# Patient Record
Sex: MALE | Race: BLACK OR AFRICAN AMERICAN | NOT HISPANIC OR LATINO | URBAN - METROPOLITAN AREA
[De-identification: names, ages, dates, MRNs, and addresses within clinical notes are randomized per-mention and may not be internally consistent; named-entity substitution may affect disease eponyms.]

---

## 2018-05-03 ENCOUNTER — INPATIENT (INPATIENT)
Facility: HOSPITAL | Age: 53
LOS: 3 days | Discharge: HOME | End: 2018-05-07
Attending: INTERNAL MEDICINE | Admitting: INTERNAL MEDICINE
Payer: COMMERCIAL

## 2018-05-03 VITALS
HEART RATE: 73 BPM | DIASTOLIC BLOOD PRESSURE: 78 MMHG | OXYGEN SATURATION: 98 % | TEMPERATURE: 99 F | HEIGHT: 64 IN | WEIGHT: 147.93 LBS | RESPIRATION RATE: 18 BRPM | SYSTOLIC BLOOD PRESSURE: 148 MMHG

## 2018-05-03 DIAGNOSIS — K56.609 UNSPECIFIED INTESTINAL OBSTRUCTION, UNSPECIFIED AS TO PARTIAL VERSUS COMPLETE OBSTRUCTION: ICD-10-CM

## 2018-05-03 DIAGNOSIS — K43.9 VENTRAL HERNIA WITHOUT OBSTRUCTION OR GANGRENE: ICD-10-CM

## 2018-05-03 LAB
ALBUMIN SERPL ELPH-MCNC: 4.3 G/DL — SIGNIFICANT CHANGE UP (ref 3.3–5)
ALP SERPL-CCNC: 89 U/L — SIGNIFICANT CHANGE UP (ref 40–120)
ALT FLD-CCNC: 35 U/L — SIGNIFICANT CHANGE UP (ref 12–78)
ANION GAP SERPL CALC-SCNC: 12 MMOL/L — SIGNIFICANT CHANGE UP (ref 5–17)
APPEARANCE UR: CLEAR — SIGNIFICANT CHANGE UP
APTT BLD: 25.8 SEC — LOW (ref 27.5–37.4)
AST SERPL-CCNC: 24 U/L — SIGNIFICANT CHANGE UP (ref 15–37)
BASOPHILS # BLD AUTO: 0.03 K/UL — SIGNIFICANT CHANGE UP (ref 0–0.2)
BASOPHILS NFR BLD AUTO: 0.3 % — SIGNIFICANT CHANGE UP (ref 0–2)
BILIRUB SERPL-MCNC: 0.4 MG/DL — SIGNIFICANT CHANGE UP (ref 0.2–1.2)
BILIRUB UR-MCNC: NEGATIVE — SIGNIFICANT CHANGE UP
BLD GP AB SCN SERPL QL: SIGNIFICANT CHANGE UP
BUN SERPL-MCNC: 18 MG/DL — SIGNIFICANT CHANGE UP (ref 7–23)
CALCIUM SERPL-MCNC: 10.3 MG/DL — HIGH (ref 8.5–10.1)
CHLORIDE SERPL-SCNC: 101 MMOL/L — SIGNIFICANT CHANGE UP (ref 96–108)
CO2 SERPL-SCNC: 29 MMOL/L — SIGNIFICANT CHANGE UP (ref 22–31)
COLOR SPEC: YELLOW — SIGNIFICANT CHANGE UP
CREAT SERPL-MCNC: 1.17 MG/DL — SIGNIFICANT CHANGE UP (ref 0.5–1.3)
DIFF PNL FLD: NEGATIVE — SIGNIFICANT CHANGE UP
EOSINOPHIL # BLD AUTO: 0.02 K/UL — SIGNIFICANT CHANGE UP (ref 0–0.5)
EOSINOPHIL NFR BLD AUTO: 0.2 % — SIGNIFICANT CHANGE UP (ref 0–6)
GLUCOSE SERPL-MCNC: 207 MG/DL — HIGH (ref 70–99)
GLUCOSE UR QL: 100 MG/DL
HCT VFR BLD CALC: 47 % — SIGNIFICANT CHANGE UP (ref 39–50)
HGB BLD-MCNC: 15.8 G/DL — SIGNIFICANT CHANGE UP (ref 13–17)
IMM GRANULOCYTES NFR BLD AUTO: 0.2 % — SIGNIFICANT CHANGE UP (ref 0–1.5)
INR BLD: 1.12 RATIO — SIGNIFICANT CHANGE UP (ref 0.88–1.16)
KETONES UR-MCNC: ABNORMAL
LACTATE SERPL-SCNC: 2.4 MMOL/L — HIGH (ref 0.7–2)
LACTATE SERPL-SCNC: 2.8 MMOL/L — HIGH (ref 0.7–2)
LEUKOCYTE ESTERASE UR-ACNC: NEGATIVE — SIGNIFICANT CHANGE UP
LIDOCAIN IGE QN: 151 U/L — SIGNIFICANT CHANGE UP (ref 73–393)
LYMPHOCYTES # BLD AUTO: 1.36 K/UL — SIGNIFICANT CHANGE UP (ref 1–3.3)
LYMPHOCYTES # BLD AUTO: 14.6 % — SIGNIFICANT CHANGE UP (ref 13–44)
MCHC RBC-ENTMCNC: 29.6 PG — SIGNIFICANT CHANGE UP (ref 27–34)
MCHC RBC-ENTMCNC: 33.6 GM/DL — SIGNIFICANT CHANGE UP (ref 32–36)
MCV RBC AUTO: 88.2 FL — SIGNIFICANT CHANGE UP (ref 80–100)
MONOCYTES # BLD AUTO: 0.27 K/UL — SIGNIFICANT CHANGE UP (ref 0–0.9)
MONOCYTES NFR BLD AUTO: 2.9 % — SIGNIFICANT CHANGE UP (ref 2–14)
NEUTROPHILS # BLD AUTO: 7.6 K/UL — HIGH (ref 1.8–7.4)
NEUTROPHILS NFR BLD AUTO: 81.8 % — HIGH (ref 43–77)
NITRITE UR-MCNC: NEGATIVE — SIGNIFICANT CHANGE UP
NRBC # BLD: 0 /100 WBCS — SIGNIFICANT CHANGE UP (ref 0–0)
PH UR: 8 — SIGNIFICANT CHANGE UP (ref 5–8)
PLATELET # BLD AUTO: 226 K/UL — SIGNIFICANT CHANGE UP (ref 150–400)
POTASSIUM SERPL-MCNC: 4.5 MMOL/L — SIGNIFICANT CHANGE UP (ref 3.5–5.3)
POTASSIUM SERPL-SCNC: 4.5 MMOL/L — SIGNIFICANT CHANGE UP (ref 3.5–5.3)
PROT SERPL-MCNC: 8.6 GM/DL — HIGH (ref 6–8.3)
PROT UR-MCNC: 15 MG/DL
PROTHROM AB SERPL-ACNC: 12.2 SEC — SIGNIFICANT CHANGE UP (ref 9.8–12.7)
RBC # BLD: 5.33 M/UL — SIGNIFICANT CHANGE UP (ref 4.2–5.8)
RBC # FLD: 12.9 % — SIGNIFICANT CHANGE UP (ref 10.3–14.5)
SODIUM SERPL-SCNC: 142 MMOL/L — SIGNIFICANT CHANGE UP (ref 135–145)
SP GR SPEC: 1.01 — SIGNIFICANT CHANGE UP (ref 1.01–1.02)
UROBILINOGEN FLD QL: NEGATIVE MG/DL — SIGNIFICANT CHANGE UP
WBC # BLD: 9.3 K/UL — SIGNIFICANT CHANGE UP (ref 3.8–10.5)
WBC # FLD AUTO: 9.3 K/UL — SIGNIFICANT CHANGE UP (ref 3.8–10.5)

## 2018-05-03 PROCEDURE — 74177 CT ABD & PELVIS W/CONTRAST: CPT | Mod: 26

## 2018-05-03 PROCEDURE — 71045 X-RAY EXAM CHEST 1 VIEW: CPT | Mod: 26

## 2018-05-03 PROCEDURE — 99285 EMERGENCY DEPT VISIT HI MDM: CPT | Mod: 25

## 2018-05-03 RX ORDER — ONDANSETRON 8 MG/1
4 TABLET, FILM COATED ORAL ONCE
Qty: 0 | Refills: 0 | Status: COMPLETED | OUTPATIENT
Start: 2018-05-03 | End: 2018-05-03

## 2018-05-03 RX ORDER — IOHEXOL 300 MG/ML
30 INJECTION, SOLUTION INTRAVENOUS ONCE
Qty: 0 | Refills: 0 | Status: COMPLETED | OUTPATIENT
Start: 2018-05-03 | End: 2018-05-03

## 2018-05-03 RX ORDER — MORPHINE SULFATE 50 MG/1
2 CAPSULE, EXTENDED RELEASE ORAL ONCE
Qty: 0 | Refills: 0 | Status: DISCONTINUED | OUTPATIENT
Start: 2018-05-03 | End: 2018-05-03

## 2018-05-03 RX ORDER — PANTOPRAZOLE SODIUM 20 MG/1
40 TABLET, DELAYED RELEASE ORAL ONCE
Qty: 0 | Refills: 0 | Status: COMPLETED | OUTPATIENT
Start: 2018-05-03 | End: 2018-05-03

## 2018-05-03 RX ORDER — SODIUM CHLORIDE 9 MG/ML
1000 INJECTION INTRAMUSCULAR; INTRAVENOUS; SUBCUTANEOUS ONCE
Qty: 0 | Refills: 0 | Status: COMPLETED | OUTPATIENT
Start: 2018-05-03 | End: 2018-05-03

## 2018-05-03 RX ORDER — MORPHINE SULFATE 50 MG/1
2 CAPSULE, EXTENDED RELEASE ORAL EVERY 6 HOURS
Qty: 0 | Refills: 0 | Status: DISCONTINUED | OUTPATIENT
Start: 2018-05-03 | End: 2018-05-06

## 2018-05-03 RX ORDER — HEPARIN SODIUM 5000 [USP'U]/ML
5000 INJECTION INTRAVENOUS; SUBCUTANEOUS EVERY 12 HOURS
Qty: 0 | Refills: 0 | Status: DISCONTINUED | OUTPATIENT
Start: 2018-05-03 | End: 2018-05-07

## 2018-05-03 RX ORDER — SODIUM CHLORIDE 9 MG/ML
2000 INJECTION INTRAMUSCULAR; INTRAVENOUS; SUBCUTANEOUS ONCE
Qty: 0 | Refills: 0 | Status: COMPLETED | OUTPATIENT
Start: 2018-05-03 | End: 2018-05-03

## 2018-05-03 RX ORDER — SODIUM CHLORIDE 9 MG/ML
3 INJECTION INTRAMUSCULAR; INTRAVENOUS; SUBCUTANEOUS ONCE
Qty: 0 | Refills: 0 | Status: COMPLETED | OUTPATIENT
Start: 2018-05-03 | End: 2018-05-03

## 2018-05-03 RX ORDER — SODIUM CHLORIDE 9 MG/ML
1000 INJECTION, SOLUTION INTRAVENOUS
Qty: 0 | Refills: 0 | Status: DISCONTINUED | OUTPATIENT
Start: 2018-05-03 | End: 2018-05-06

## 2018-05-03 RX ADMIN — HEPARIN SODIUM 5000 UNIT(S): 5000 INJECTION INTRAVENOUS; SUBCUTANEOUS at 17:59

## 2018-05-03 RX ADMIN — IOHEXOL 30 MILLILITER(S): 300 INJECTION, SOLUTION INTRAVENOUS at 06:45

## 2018-05-03 RX ADMIN — SODIUM CHLORIDE 2000 MILLILITER(S): 9 INJECTION INTRAMUSCULAR; INTRAVENOUS; SUBCUTANEOUS at 08:33

## 2018-05-03 RX ADMIN — SODIUM CHLORIDE 100 MILLILITER(S): 9 INJECTION, SOLUTION INTRAVENOUS at 13:05

## 2018-05-03 RX ADMIN — SODIUM CHLORIDE 1000 MILLILITER(S): 9 INJECTION INTRAMUSCULAR; INTRAVENOUS; SUBCUTANEOUS at 04:24

## 2018-05-03 RX ADMIN — ONDANSETRON 4 MILLIGRAM(S): 8 TABLET, FILM COATED ORAL at 04:24

## 2018-05-03 RX ADMIN — MORPHINE SULFATE 2 MILLIGRAM(S): 50 CAPSULE, EXTENDED RELEASE ORAL at 07:23

## 2018-05-03 RX ADMIN — MORPHINE SULFATE 2 MILLIGRAM(S): 50 CAPSULE, EXTENDED RELEASE ORAL at 06:45

## 2018-05-03 RX ADMIN — PANTOPRAZOLE SODIUM 40 MILLIGRAM(S): 20 TABLET, DELAYED RELEASE ORAL at 04:25

## 2018-05-03 RX ADMIN — SODIUM CHLORIDE 3 MILLILITER(S): 9 INJECTION INTRAMUSCULAR; INTRAVENOUS; SUBCUTANEOUS at 04:27

## 2018-05-03 NOTE — ED PROVIDER NOTE - MEDICAL DECISION MAKING DETAILS
Pending CT and repeat lactate. Patient care transitioned to incoming team.  All decisions regarding the progression of care will be made at their discretion.

## 2018-05-03 NOTE — ED ADULT NURSE REASSESSMENT NOTE - NS ED NURSE REASSESS COMMENT FT1
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

## 2018-05-03 NOTE — CONSULT NOTE ADULT - ASSESSMENT
Findings consistent with a partial small bowel obstruction with   transition in the mid to distal ileum in the left midabdomen anteriorly.

## 2018-05-03 NOTE — ED ADULT NURSE REASSESSMENT NOTE - NS ED NURSE REASSESS COMMENT FT1
patient A&Ox3 in no acute distress , denied chest [pain , denied headache denied difficulty breathing

## 2018-05-03 NOTE — ED PROVIDER NOTE - CARE PLAN
Principal Discharge DX:	Acute gastritis without hemorrhage, unspecified gastritis type Principal Discharge DX:	SBO (small bowel obstruction)

## 2018-05-03 NOTE — ED PROVIDER NOTE - OBJECTIVE STATEMENT
Pertinent PMH/PSH/FHx/SHx and Review of Systems contained within:  54 yo m with pmh of umbilical hernia presents in ED c/o abdominal pain and nbnb vomitus today s/p eating pasta and meat. No aggravating or relieving factors, No fever/chills, No photophobia/eye pain/changes in vision, No ear pain/sore throat/dysphagia, No chest pain/palpitations, no SOB/cough/wheeze/stridor, No D, no dysuria/frequency/discharge, No neck/back pain, no rash, no changes in neurological status/function.

## 2018-05-03 NOTE — ED ADULT NURSE REASSESSMENT NOTE - NS ED NURSE REASSESS COMMENT FT1
Pt alert and oriented, no apparent distress noted at this time. Pt handed off to RN in stable condition.

## 2018-05-03 NOTE — H&P ADULT - HISTORY OF PRESENT ILLNESS
54 yo m with pmh of umbilical hernia presents in ED c/o abdominal pain and nbnb vomitus today s/p eating pasta and meat. No aggravating or relieving factors, No fever/chills, No photophobia/eye pain/changes in vision, No ear pain/sore throat/dysphagia, No chest pain/palpitations, no SOB/cough/wheeze/stridor, No D, no dysuria/frequency/discharge, No neck/back pain, no rash, no changes in neurological status/function.

## 2018-05-03 NOTE — H&P ADULT - NSHPPHYSICALEXAM_GEN_ALL_CORE
PHYSICAL EXAM:    GENERAL: NAD, well-groomed, well-developed  HEAD:  Atraumatic, Normocephalic  EYES: EOMI, PERRLA, conjunctiva and sclera clear  ENMT: No tonsillar erythema, exudates, or enlargement; Moist mucous membranes, Good dentition, No lesions  NECK: Supple, No JVD, Normal thyroid  NERVOUS SYSTEM:  Alert & Oriented X3, Good concentration; Motor Strength 5/5 B/L upper and lower extremities; DTRs 2+ intact and symmetric  CHEST/LUNG: Clear to percussion bilaterally; No rales, rhonchi, wheezing, or rubs  HEART: Regular rate and rhythm; No murmurs, rubs, or gallops  ABDOMEN: tender, distended; Bowel sounds exaggerated  EXTREMITIES:  2+ Peripheral Pulses, No clubbing, cyanosis, or edema  LYMPH: No lymphadenopathy noted  SKIN: No rashes or lesions

## 2018-05-03 NOTE — H&P ADULT - NSHPLABSRESULTS_GEN_ALL_CORE
CBC Full  -  ( 03 May 2018 04:36 )  WBC Count : 9.30 K/uL  Hemoglobin : 15.8 g/dL  Hematocrit : 47.0 %  Platelet Count - Automated : 226 K/uL  Mean Cell Volume : 88.2 fl  Mean Cell Hemoglobin : 29.6 pg  Mean Cell Hemoglobin Concentration : 33.6 gm/dL  Auto Neutrophil # : 7.60 K/uL  Auto Lymphocyte # : 1.36 K/uL  Auto Monocyte # : 0.27 K/uL  Auto Eosinophil # : 0.02 K/uL  Auto Basophil # : 0.03 K/uL  Auto Neutrophil % : 81.8 %  Auto Lymphocyte % : 14.6 %  Auto Monocyte % : 2.9 %  Auto Eosinophil % : 0.2 %  Auto Basophil % : 0.3 %  03 May 2018 04:36    142    |  101    |  18     ----------------------------<  207    4.5     |  29     |  1.17     Ca    10.3       03 May 2018 04:36    TPro  8.6    /  Alb  4.3    /  TBili  0.4    /  DBili  x      /  AST  24     /  ALT  35     /  AlkPhos  89     03 May 2018 04:    < from: CT Abdomen and Pelvis w/ Oral Cont and w/ IV Cont (05.03.18 @ 08:25) >      Findings consistent with a partial small bowel obstruction with   transition in the mid to distal ileum in the left midabdomen anteriorly.   Evidence of prior surgery, and appearance suggests cicatricial change   and/or scarring in the region of transition. Also there is a ventral   hernia in the region of the umbilicus, with the prolapse of bowel and fat   but without incarceration. Scarring is also suggested in this region.   Further assessment and follow-up recommended.    < end of copied text >

## 2018-05-03 NOTE — CONSULT NOTE ADULT - PROBLEM SELECTOR RECOMMENDATION 9
Admitted to Dr Anyoku  Bowel rest  IV Fluid Resuscitation  Plan NGT if recurrent vomiting  Repeat Labs and AXR

## 2018-05-03 NOTE — ED ADULT NURSE NOTE - OBJECTIVE STATEMENT
52 y/o male PMHx umbilical hernia presents to the ED with lower abd pain time of onset 9pm after eating dinner at  home. c/o N/V, denies diarrhea, dysuria, hematuria, flank pain, fevers, chills 52 y/o male PMHx umbilical hernia, hernia repair presents to the ED with sharp lower abd pain time of onset 9pm after eating dinner at  home. c/o N/V, denies diarrhea, dysuria, hematuria, flank pain, fevers, chills.

## 2018-05-03 NOTE — CONSULT NOTE ADULT - SUBJECTIVE AND OBJECTIVE BOX
HPI:  54 yo m with pmh of umbilical hernia presents in ED c/o abdominal pain and nbnb vomitus today s/p eating pasta and meat.   No aggravating or relieving factors, No fever/chills, No photophobia/eye pain/changes in vision, No ear pain/sore throat/dysphagia,   No chest pain/palpitations, no SOB/cough/wheeze/stridor, No D, no dysuria/frequency/discharge, No neck/back pain, no rash, no changes in neurological status/function.      PAST MEDICAL & SURGICAL HISTORY:  Umbilical hernia  Hx of removal of cyst: scalp-2008      MEDICATIONS  (STANDING):    MEDICATIONS  (PRN):      Allergies    No Known Allergies    Intolerances        SOCIAL HISTORY:  No drug use    FAMILY HISTORY:      REVIEW OF SYSTEMS:  CONSTITUTIONAL: In no acute distress.  EYES: No eye pain, visual disturbances, or discharge  ENMT:  No difficulty hearing, tinnitus, vertigo; No sinus or throat pain  NECK: No pain or stiffness  BREASTS: No pain, masses, or nipple discharge  RESPIRATORY: No cough, wheezing, chills or hemoptysis; No shortness of breath  CARDIOVASCULAR: No chest pain, palpitations, dizziness, or leg swelling  GASTROINTESTINAL: abdominal pain.  nausea, vomiting,   GENITOURINARY: No dysuria, frequency, hematuria, or incontinence  NEUROLOGICAL: No headaches, memory loss, loss of strength, numbness, or tremors  SKIN: No itching, burning, rashes, or lesions   LYMPH NODES: No enlarged glands  ENDOCRINE: No heat or cold intolerance; No hair loss  MUSCULOSKELETAL: No joint pain or swelling; No muscle, back, or extremity pain  PSYCHIATRIC: No depression, anxiety, mood swings, or difficulty sleeping  HEME/LYMPH: No easy bruising, or bleeding gums  ALLERGY AND IMMUNOLOGIC: No hives or eczema      Vital Signs Last 24 Hrs  T(C): 36.7 (03 May 2018 07:23), Max: 37.1 (03 May 2018 03:08)  T(F): 98.1 (03 May 2018 07:23), Max: 98.7 (03 May 2018 03:08)  HR: 80 (03 May 2018 07:23) (73 - 80)  BP: 123/84 (03 May 2018 07:23) (123/84 - 155/83)  BP(mean): --  RR: 18 (03 May 2018 07:23) (18 - 18)  SpO2: 98% (03 May 2018 07:23) (98% - 100%)    Daily Height in cm: 162.56 (03 May 2018 03:08)    Daily     PHYSICAL EXAM:  GENERAL: NAD, well-groomed, well-developed  HEAD:  Atraumatic, Normocephalic  EYES: EOMI, PERRL, conjunctiva and sclera clear  ENMT: Moist mucous membranes, Good dentition, No lesions  NECK: Supple, No JVD  NERVOUS SYSTEM:  Alert & Oriented X3, Good concentration  CHEST/LUNG: Clear to percussion bilaterally; Normal respiratory effort  HEART: Regular rate and rhythm  ABDOMEN: Soft, mild tenderness, mild distended; Bowel sounds hypoactive  : normal external genitalia  BREASTS: no breast lumps  EXTREMITIES:  No clubbing, cyanosis, or edema  VASC: equal peripheral pulses  LYMPH: No lymphadenopathy noted  SKIN: No rashes or lesions  PSYCH: normal affect    LABS:                        15.8   9.30  )-----------( 226      ( 03 May 2018 04:36 )             47.0     Neutrophil %:       142  |  101  |  18  ----------------------------<  207<H>  4.5   |  29  |  1.17    Ca    10.3<H>      03 May 2018 04:36    TPro  8.6<H>  /  Alb  4.3  /  TBili  0.4  /  DBili  x   /  AST  24  /  ALT  35  /  AlkPhos  89        Urinalysis Basic - ( 03 May 2018 06:18 )    Color: Yellow / Appearance: Clear / S.015 / pH: x  Gluc: x / Ketone: Trace  / Bili: Negative / Urobili: Negative mg/dL   Blood: x / Protein: 15 mg/dL / Nitrite: Negative   Leuk Esterase: Negative / RBC: x / WBC x   Sq Epi: x / Non Sq Epi: x / Bacteria: x        RADIOLOGY & ADDITIONAL STUDIES:  < from: CT Abdomen and Pelvis w/ Oral Cont and w/ IV Cont (18 @ 08:25) >  GASTROINTESTINAL STRUCTURES:  There is dilatation of small bowel loops in   the jejunum and ileum. There is a gradual transition in the right   midabdomen, in a region suggesting the possibility of scar and/or   cicatricial change rather than a discrete mass. There is no CT evidence   of appendicitis. There is stool in the colon. There is suggestion of   prior abdominal surgery, and there is prolapse of bowel and fat in the   umbilicus region through a rectus diastases.  HEPATOBILIARY:  The gallbladder is normal in appearance.  No calculus is   visible.  The intrahepatic biliary ducts are not dilated.  The common   duct is normal in size.  LIVER:  Unremarkable in appearance.  SPLEEN:  No enlargement or focal lesion is evident.    PANCREAS:  Unremarkable in appearance.  ADRENAL GLANDS:  Within normal limits.  KIDNEYS:  No calculi or hydronephrosis is noted. There is a left renal   cyst measuring 4 cm.  AORTA:  Normal in size.   PELVIC ORGANS:  Unremarkable in appearance.  BLADDER:  No wall thickening or filling defect is noted.  No UVJ calculus   is suggested.  BONES:  No evidence of fracture or bony destructive lesion.  MISC:A disc herniation is noted at L4-5 with a bulge at L5-S1. Lower   thoracic degenerative changes are noted.    < from: CT Abdomen and Pelvis w/ Oral Cont and w/ IV Cont (18 @ 08:25) >  IMPRESSION:       Findings consistent with a partial small bowel obstruction with   transition in the mid to distal ileum in the left midabdomen anteriorly.   Evidence of prior surgery, and appearance suggests cicatricial change   and/or scarring in the region of transition. Also there is a ventral   hernia in the region of the umbilicus, with the prolapse of bowel and fat   but without incarceration. Scarring is also suggested in this region.   Further assessment and follow-up recommended.    < end of copied text >

## 2018-05-04 LAB
ALBUMIN SERPL ELPH-MCNC: 3.1 G/DL — LOW (ref 3.3–5)
ALP SERPL-CCNC: 68 U/L — SIGNIFICANT CHANGE UP (ref 40–120)
ALT FLD-CCNC: 24 U/L — SIGNIFICANT CHANGE UP (ref 12–78)
ANION GAP SERPL CALC-SCNC: 8 MMOL/L — SIGNIFICANT CHANGE UP (ref 5–17)
AST SERPL-CCNC: 16 U/L — SIGNIFICANT CHANGE UP (ref 15–37)
BILIRUB SERPL-MCNC: 0.4 MG/DL — SIGNIFICANT CHANGE UP (ref 0.2–1.2)
BUN SERPL-MCNC: 9 MG/DL — SIGNIFICANT CHANGE UP (ref 7–23)
CALCIUM SERPL-MCNC: 8.6 MG/DL — SIGNIFICANT CHANGE UP (ref 8.5–10.1)
CHLORIDE SERPL-SCNC: 110 MMOL/L — HIGH (ref 96–108)
CO2 SERPL-SCNC: 27 MMOL/L — SIGNIFICANT CHANGE UP (ref 22–31)
CREAT SERPL-MCNC: 0.97 MG/DL — SIGNIFICANT CHANGE UP (ref 0.5–1.3)
CULTURE RESULTS: NO GROWTH — SIGNIFICANT CHANGE UP
GLUCOSE SERPL-MCNC: 153 MG/DL — HIGH (ref 70–99)
HCT VFR BLD CALC: 38 % — LOW (ref 39–50)
HGB BLD-MCNC: 12.7 G/DL — LOW (ref 13–17)
LACTATE SERPL-SCNC: 1.2 MMOL/L — SIGNIFICANT CHANGE UP (ref 0.7–2)
MCHC RBC-ENTMCNC: 29.6 PG — SIGNIFICANT CHANGE UP (ref 27–34)
MCHC RBC-ENTMCNC: 33.4 GM/DL — SIGNIFICANT CHANGE UP (ref 32–36)
MCV RBC AUTO: 88.6 FL — SIGNIFICANT CHANGE UP (ref 80–100)
NRBC # BLD: 0 /100 WBCS — SIGNIFICANT CHANGE UP (ref 0–0)
PLATELET # BLD AUTO: 177 K/UL — SIGNIFICANT CHANGE UP (ref 150–400)
POTASSIUM SERPL-MCNC: 3.6 MMOL/L — SIGNIFICANT CHANGE UP (ref 3.5–5.3)
POTASSIUM SERPL-SCNC: 3.6 MMOL/L — SIGNIFICANT CHANGE UP (ref 3.5–5.3)
PROT SERPL-MCNC: 6.4 GM/DL — SIGNIFICANT CHANGE UP (ref 6–8.3)
RBC # BLD: 4.29 M/UL — SIGNIFICANT CHANGE UP (ref 4.2–5.8)
RBC # FLD: 13.1 % — SIGNIFICANT CHANGE UP (ref 10.3–14.5)
SODIUM SERPL-SCNC: 145 MMOL/L — SIGNIFICANT CHANGE UP (ref 135–145)
SPECIMEN SOURCE: SIGNIFICANT CHANGE UP
WBC # BLD: 4.58 K/UL — SIGNIFICANT CHANGE UP (ref 3.8–10.5)
WBC # FLD AUTO: 4.58 K/UL — SIGNIFICANT CHANGE UP (ref 3.8–10.5)

## 2018-05-04 PROCEDURE — 74018 RADEX ABDOMEN 1 VIEW: CPT | Mod: 26,59

## 2018-05-04 PROCEDURE — 74019 RADEX ABDOMEN 2 VIEWS: CPT | Mod: 26

## 2018-05-04 RX ADMIN — SODIUM CHLORIDE 100 MILLILITER(S): 9 INJECTION, SOLUTION INTRAVENOUS at 21:30

## 2018-05-04 RX ADMIN — HEPARIN SODIUM 5000 UNIT(S): 5000 INJECTION INTRAVENOUS; SUBCUTANEOUS at 17:20

## 2018-05-04 RX ADMIN — HEPARIN SODIUM 5000 UNIT(S): 5000 INJECTION INTRAVENOUS; SUBCUTANEOUS at 05:29

## 2018-05-04 RX ADMIN — SODIUM CHLORIDE 100 MILLILITER(S): 9 INJECTION, SOLUTION INTRAVENOUS at 11:34

## 2018-05-04 NOTE — PROGRESS NOTE ADULT - SUBJECTIVE AND OBJECTIVE BOX
CC:  Patient seen and examined bedside resting comfortably.  No new complaints offered.   Denies chest pain, dyspnea, cough.      HPI:  52 yo m with pmh of umbilical hernia presents in ED c/o abdominal pain and nbnb vomitus today s/p eating pasta and meat. No aggravating or relieving factors, No fever/chills, No photophobia/eye pain/changes in vision, No ear pain/sore throat/dysphagia, No chest pain/palpitations, no SOB/cough/wheeze/stridor, No D, no dysuria/frequency/discharge, No neck/back pain, no rash, no changes in neurological status/function. (03 May 2018 09:40)      T(F): 97.8 (05-04-18 @ 12:10), Max: 98.2 (05-03-18 @ 23:48)  HR: 62 (05-04-18 @ 12:10) (62 - 69)  BP: 151/93 (05-04-18 @ 12:10) (134/83 - 151/93)  RR: 18 (05-04-18 @ 12:10) (17 - 20)  SpO2: 99% (05-04-18 @ 12:10) (91% - 99%)  Wt(kg): --  CAPILLARY BLOOD GLUCOSE          PHYSICAL EXAM:  General: NAD, WDWN.   Neuro:  Alert & oriented x 3  HEENT: NCAT, EOMI, conjunctiva clear  CV: +S1+S2 regular rate and rhythm  Lung: clear to ausculation bilaterally, respirations nonlabored, good inspiratory effort  Abdomen: soft, NTND. Normactive BS  Extremities: no pedal edema or calf tenderness noted     LABS:                        12.7   4.58  )-----------( 177      ( 04 May 2018 06:17 )             38.0     05-04    145  |  110<H>  |  9   ----------------------------<  153<H>  3.6   |  27  |  0.97    Ca    8.6      04 May 2018 06:17    TPro  6.4  /  Alb  3.1<L>  /  TBili  0.4  /  DBili  x   /  AST  16  /  ALT  24  /  AlkPhos  68  05-04    PT/INR - ( 03 May 2018 12:28 )   PT: 12.2 sec;   INR: 1.12 ratio         PTT - ( 03 May 2018 12:28 )  PTT:25.8 sec      I&O's Detail    03 May 2018 07:01  -  04 May 2018 07:00  --------------------------------------------------------  IN:    dextrose 5% + sodium chloride 0.9%.: 1400 mL  Total IN: 1400 mL    OUT:    Nasoenteral Tube: 200 mL  Total OUT: 200 mL    Total NET: 1200 mL            Assessment: 53y Male admitted with SMALL BOWEL OBSTRUCTION  ABDOMINAL PAIN    PMH Umbilical hernia      Plan:  -  -continue VTE and GI prophylaxis: heparin and protonix IV  -f/u labs  -will discuss with

## 2018-05-04 NOTE — PROGRESS NOTE ADULT - SUBJECTIVE AND OBJECTIVE BOX
SURGERY PROGRESS HPI:  Pt seen and examined at bedside with family present. Denies pain and complaints. Pt tolerating NGT. Pt denies nausea and vomiting. +Passed flatus a few times today. No BM. Voiding well. Pt denies chest pain, SOB, dizziness, fever, chills. Ambulating.    Vital Signs Last 24 Hrs  T(C): 36.6 (04 May 2018 18:20), Max: 36.8 (03 May 2018 23:48)  T(F): 97.9 (04 May 2018 18:20), Max: 98.2 (03 May 2018 23:48)  HR: 76 (04 May 2018 18:20) (62 - 76)  BP: 155/82 (04 May 2018 18:20) (134/83 - 155/82)  BP(mean): --  RR: 16 (04 May 2018 18:20) (16 - 18)  SpO2: 98% (04 May 2018 18:20) (97% - 99%)      PHYSICAL EXAM:    GENERAL: NAD  HEAD: NGT with bilious output 300cc/12hrs  CHEST/LUNG: Clear to ausculation, bilaterally   HEART: RRR S1S2  ABDOMEN: non distended, +BS, soft, non tender, no guarding  EXTREMITIES:  calf soft, non tender b/l    I&O's Detail    03 May 2018 07:01  -  04 May 2018 07:00  --------------------------------------------------------  IN:    dextrose 5% + sodium chloride 0.9%.: 1400 mL  Total IN: 1400 mL    OUT:    Nasoenteral Tube: 200 mL  Total OUT: 200 mL    Total NET: 1200 mL      04 May 2018 07:01  -  04 May 2018 21:28  --------------------------------------------------------  IN:    Oral Fluid: 240 mL  Total IN: 240 mL    OUT:    Nasoenteral Tube: 300 mL    Voided: 300 mL  Total OUT: 600 mL    Total NET: -360 mL      LABS:                        12.7   4.58  )-----------( 177      ( 04 May 2018 06:17 )             38.0     05-04    145  |  110<H>  |  9   ----------------------------<  153<H>  3.6   |  27  |  0.97    Ca    8.6      04 May 2018 06:17    TPro  6.4  /  Alb  3.1<L>  /  TBili  0.4  /  DBili  x   /  AST  16  /  ALT  24  /  AlkPhos  68  05-04    PT/INR - ( 03 May 2018 12:28 )   PT: 12.2 sec;   INR: 1.12 ratio    PTT - ( 03 May 2018 12:28 )  PTT:25.8 sec    Xray Abdomen Minimum 3 Views (05.04.18 @ 09:34)   IMPRESSION: No visible bowel distention at this time. NG tube is coiled   in the stomach and incidental findings as above.      Assessment: 53M admitted with PSBO. +Flatus    Plan:  -Per discussion with Dr. Hodges, will withdraw NGT partially to the appropriate position  -possible clamp trial tomorrow   -continue DVT prophylaxis, OOB, Ambulating   -f/u labs  -discussed with Dr. Hodges SURGERY PROGRESS HPI:  Pt seen and examined at bedside with family present. Denies pain and complaints.   Pt tolerating NGT. Pt denies nausea and vomiting. +Passed flatus a few times today. No BM. Voiding well. Pt denies chest pain, SOB, dizziness, fever, chills. Ambulating.    Vital Signs Last 24 Hrs  T(C): 36.6 (04 May 2018 18:20), Max: 36.8 (03 May 2018 23:48)  T(F): 97.9 (04 May 2018 18:20), Max: 98.2 (03 May 2018 23:48)  HR: 76 (04 May 2018 18:20) (62 - 76)  BP: 155/82 (04 May 2018 18:20) (134/83 - 155/82)  BP(mean): --  RR: 16 (04 May 2018 18:20) (16 - 18)  SpO2: 98% (04 May 2018 18:20) (97% - 99%)      PHYSICAL EXAM:    GENERAL: NAD  HEAD: NGT with bilious output 300cc/12hrs  CHEST/LUNG: Clear to ausculation, bilaterally   HEART: RRR S1S2  ABDOMEN: non distended, +BS, soft, non tender, no guarding  EXTREMITIES:  calf soft, non tender b/l    I&O's Detail    03 May 2018 07:01  -  04 May 2018 07:00  --------------------------------------------------------  IN:    dextrose 5% + sodium chloride 0.9%.: 1400 mL  Total IN: 1400 mL    OUT:    Nasoenteral Tube: 200 mL  Total OUT: 200 mL    Total NET: 1200 mL      04 May 2018 07:01  -  04 May 2018 21:28  --------------------------------------------------------  IN:    Oral Fluid: 240 mL  Total IN: 240 mL    OUT:    Nasoenteral Tube: 300 mL    Voided: 300 mL  Total OUT: 600 mL    Total NET: -360 mL      LABS:                        12.7   4.58  )-----------( 177      ( 04 May 2018 06:17 )             38.0     05-04    145  |  110<H>  |  9   ----------------------------<  153<H>  3.6   |  27  |  0.97    Ca    8.6      04 May 2018 06:17    TPro  6.4  /  Alb  3.1<L>  /  TBili  0.4  /  DBili  x   /  AST  16  /  ALT  24  /  AlkPhos  68  05-04    PT/INR - ( 03 May 2018 12:28 )   PT: 12.2 sec;   INR: 1.12 ratio    PTT - ( 03 May 2018 12:28 )  PTT:25.8 sec    Xray Abdomen Minimum 3 Views (05.04.18 @ 09:34)   IMPRESSION: No visible bowel distention at this time. NG tube is coiled   in the stomach and incidental findings as above.      Assessment: 53M admitted with PSBO. +Flatus    Plan:  -Per discussion with Dr. Hodges, will withdraw NGT partially to the appropriate position  -possible clamp trial tomorrow   -continue DVT prophylaxis, OOB, Ambulating   -f/u labs  -discussed with Dr. Hodges

## 2018-05-04 NOTE — PROGRESS NOTE ADULT - PROBLEM SELECTOR PLAN 1
Admit  NPO  NGT  Intravenous fluids  Pain management  Surgical evaluation appreciated.  continue NGT WITH SUCTION.  Serial xrays.

## 2018-05-05 LAB
ANION GAP SERPL CALC-SCNC: 10 MMOL/L — SIGNIFICANT CHANGE UP (ref 5–17)
BUN SERPL-MCNC: 6 MG/DL — LOW (ref 7–23)
CALCIUM SERPL-MCNC: 9.2 MG/DL — SIGNIFICANT CHANGE UP (ref 8.5–10.1)
CHLORIDE SERPL-SCNC: 105 MMOL/L — SIGNIFICANT CHANGE UP (ref 96–108)
CO2 SERPL-SCNC: 29 MMOL/L — SIGNIFICANT CHANGE UP (ref 22–31)
CREAT SERPL-MCNC: 0.97 MG/DL — SIGNIFICANT CHANGE UP (ref 0.5–1.3)
GLUCOSE SERPL-MCNC: 134 MG/DL — HIGH (ref 70–99)
HCT VFR BLD CALC: 42.3 % — SIGNIFICANT CHANGE UP (ref 39–50)
HGB BLD-MCNC: 14.2 G/DL — SIGNIFICANT CHANGE UP (ref 13–17)
MAGNESIUM SERPL-MCNC: 2 MG/DL — SIGNIFICANT CHANGE UP (ref 1.6–2.6)
MCHC RBC-ENTMCNC: 30.3 PG — SIGNIFICANT CHANGE UP (ref 27–34)
MCHC RBC-ENTMCNC: 33.6 GM/DL — SIGNIFICANT CHANGE UP (ref 32–36)
MCV RBC AUTO: 90.2 FL — SIGNIFICANT CHANGE UP (ref 80–100)
NRBC # BLD: 0 /100 WBCS — SIGNIFICANT CHANGE UP (ref 0–0)
PHOSPHATE SERPL-MCNC: 3.2 MG/DL — SIGNIFICANT CHANGE UP (ref 2.5–4.5)
PLATELET # BLD AUTO: 196 K/UL — SIGNIFICANT CHANGE UP (ref 150–400)
POTASSIUM SERPL-MCNC: 3.6 MMOL/L — SIGNIFICANT CHANGE UP (ref 3.5–5.3)
POTASSIUM SERPL-SCNC: 3.6 MMOL/L — SIGNIFICANT CHANGE UP (ref 3.5–5.3)
RBC # BLD: 4.69 M/UL — SIGNIFICANT CHANGE UP (ref 4.2–5.8)
RBC # FLD: 13.1 % — SIGNIFICANT CHANGE UP (ref 10.3–14.5)
SODIUM SERPL-SCNC: 144 MMOL/L — SIGNIFICANT CHANGE UP (ref 135–145)
WBC # BLD: 5.41 K/UL — SIGNIFICANT CHANGE UP (ref 3.8–10.5)
WBC # FLD AUTO: 5.41 K/UL — SIGNIFICANT CHANGE UP (ref 3.8–10.5)

## 2018-05-05 PROCEDURE — 74021 RADEX ABDOMEN 3+ VIEWS: CPT | Mod: 26

## 2018-05-05 RX ADMIN — HEPARIN SODIUM 5000 UNIT(S): 5000 INJECTION INTRAVENOUS; SUBCUTANEOUS at 17:44

## 2018-05-05 RX ADMIN — SODIUM CHLORIDE 100 MILLILITER(S): 9 INJECTION, SOLUTION INTRAVENOUS at 08:31

## 2018-05-05 NOTE — PROGRESS NOTE ADULT - SUBJECTIVE AND OBJECTIVE BOX
CC:  Patient seen and examined bedside resting comfortably.  NGT clamped.  Passing gas but no stool.  Surgical notes appreciated.  No new complaints offered.   Denies chest pain, dyspnea, cough.      HPI:  52 yo m with pmh of umbilical hernia presents in ED c/o abdominal pain and nbnb vomitus today s/p eating pasta and meat. No aggravating or relieving factors, No fever/chills, No photophobia/eye pain/changes in vision, No ear pain/sore throat/dysphagia, No chest pain/palpitations, no SOB/cough/wheeze/stridor, No D, no dysuria/frequency/discharge, No neck/back pain, no rash, no changes in neurological status/function. (03 May 2018 09:40)      T(F): 98.5 (05-05-18 @ 11:41), Max: 98.5 (05-05-18 @ 11:41)  HR: 58 (05-05-18 @ 11:41) (56 - 76)  BP: 144/78 (05-05-18 @ 11:41) (132/84 - 155/82)  RR: 17 (05-05-18 @ 11:41) (16 - 18)  SpO2: 98% (05-05-18 @ 11:41) (96% - 99%)  Wt(kg): --  CAPILLARY BLOOD GLUCOSE          PHYSICAL EXAM:  General: NAD, WDWN.   Neuro:  Alert & oriented x 3  HEENT: NCAT, EOMI, conjunctiva clear  CV: +S1+S2 regular rate and rhythm  Lung: clear to ausculation bilaterally, respirations nonlabored, good inspiratory effort  Abdomen: soft, NTND. Normactive BS  Extremities: no pedal edema or calf tenderness noted     LABS:                        14.2   5.41  )-----------( 196      ( 05 May 2018 07:59 )             42.3     05-05    144  |  105  |  6<L>  ----------------------------<  134<H>  3.6   |  29  |  0.97    Ca    9.2      05 May 2018 07:59  Phos  3.2     05-05  Mg     2.0     05-05    TPro  6.4  /  Alb  3.1<L>  /  TBili  0.4  /  DBili  x   /  AST  16  /  ALT  24  /  AlkPhos  68  05-04    PT/INR - ( 03 May 2018 12:28 )   PT: 12.2 sec;   INR: 1.12 ratio         PTT - ( 03 May 2018 12:28 )  PTT:25.8 sec      I&O's Detail    04 May 2018 07:01  -  05 May 2018 07:00  --------------------------------------------------------  IN:    dextrose 5% + sodium chloride 0.9%.: 1200 mL    Oral Fluid: 240 mL  Total IN: 1440 mL    OUT:    Nasoenteral Tube: 400 mL    Voided: 300 mL  Total OUT: 700 mL    Total NET: 740 mL            Assessment: 53y Male admitted with SMALL BOWEL OBSTRUCTION  ABDOMINAL PAIN    PMH Umbilical hernia      Plan:  -  -continue VTE and GI prophylaxis: heparin and protonix IV  -f/u labs  -will discuss with

## 2018-05-05 NOTE — PROGRESS NOTE ADULT - SUBJECTIVE AND OBJECTIVE BOX
SURGERY PROGRESS HPI:  Pt seen and examined at bedside with family present. Denies pain and complaints. Pt tolerating NGT. Pt denies nausea and vomiting. +Passed flatus a few times yesterday and overnight. No BM. Voiding well. Pt denies chest pain, SOB, dizziness, fever, chills. Ambulating.      Vital Signs Last 24 Hrs  T(C): 36.8 (05 May 2018 00:26), Max: 36.8 (05 May 2018 00:26)  T(F): 98.3 (05 May 2018 00:26), Max: 98.3 (05 May 2018 00:26)  HR: 64 (05 May 2018 00:26) (62 - 76)  BP: 132/84 (05 May 2018 00:26) (132/84 - 155/82)  BP(mean): --  RR: 16 (05 May 2018 00:26) (16 - 18)  SpO2: 96% (05 May 2018 00:26) (96% - 99%)      PHYSICAL EXAM:    GENERAL: NAD  HEAD: NGT with bilious output 100cc/12hrs  CHEST/LUNG: Clear to ausculation, bilaterally   HEART: RRR S1S2  ABDOMEN: non distended, +BS, soft, non tender, no guarding  EXTREMITIES:  calf soft, non tender b/l    I&O's Detail    03 May 2018 07:01  -  04 May 2018 07:00  --------------------------------------------------------  IN:    dextrose 5% + sodium chloride 0.9%.: 1400 mL  Total IN: 1400 mL    OUT:    Nasoenteral Tube: 200 mL  Total OUT: 200 mL    Total NET: 1200 mL      04 May 2018 07:01  -  05 May 2018 05:15  --------------------------------------------------------  IN:    Oral Fluid: 240 mL  Total IN: 240 mL    OUT:    Nasoenteral Tube: 300 mL    Voided: 300 mL  Total OUT: 600 mL    Total NET: -360 mL      LABS:                        12.7   4.58  )-----------( 177      ( 04 May 2018 06:17 )             38.0     05-04    145  |  110<H>  |  9   ----------------------------<  153<H>  3.6   |  27  |  0.97    Ca    8.6      04 May 2018 06:17    TPro  6.4  /  Alb  3.1<L>  /  TBili  0.4  /  DBili  x   /  AST  16  /  ALT  24  /  AlkPhos  68  05-04  PT/INR - ( 03 May 2018 12:28 )   PT: 12.2 sec;   INR: 1.12 ratio    PTT - ( 03 May 2018 12:28 )  PTT:25.8 sec      Assessment: 53M admitted with PSBO. +Flatus    Plan:  -clamp trial  -f/u AM AXR  -continue DVT prophylaxis, OOB, Ambulating   -f/u labs  -discussed with Dr. Hodges SURGERY PROGRESS HPI:  Pt seen and examined at bedside with family present. Denies pain and complaints. Pt tolerating NGT. Pt denies nausea and vomiting. +Passed flatus a few times yesterday and overnight. No BM. Voiding well. Pt denies chest pain, SOB, dizziness, fever, chills. Ambulating.      Vital Signs Last 24 Hrs  T(C): 36.8 (05 May 2018 00:26), Max: 36.8 (05 May 2018 00:26)  T(F): 98.3 (05 May 2018 00:26), Max: 98.3 (05 May 2018 00:26)  HR: 64 (05 May 2018 00:26) (62 - 76)  BP: 132/84 (05 May 2018 00:26) (132/84 - 155/82)  BP(mean): --  RR: 16 (05 May 2018 00:26) (16 - 18)  SpO2: 96% (05 May 2018 00:26) (96% - 99%)      PHYSICAL EXAM:    GENERAL: NAD  HEAD: NGT with bilious output 100cc/12hrs  CHEST/LUNG: Clear to ausculation, bilaterally   HEART: RRR S1S2  ABDOMEN: non distended, +BS, soft, non tender, no guarding  EXTREMITIES:  calf soft, non tender b/l    I&O's Detail    03 May 2018 07:01  -  04 May 2018 07:00  --------------------------------------------------------  IN:    dextrose 5% + sodium chloride 0.9%.: 1400 mL  Total IN: 1400 mL    OUT:    Nasoenteral Tube: 200 mL  Total OUT: 200 mL    Total NET: 1200 mL      04 May 2018 07:01  -  05 May 2018 05:15  --------------------------------------------------------  IN:    Oral Fluid: 240 mL  Total IN: 240 mL    OUT:    Nasoenteral Tube: 300 mL    Voided: 300 mL  Total OUT: 600 mL    Total NET: -360 mL      LABS:                        12.7   4.58  )-----------( 177      ( 04 May 2018 06:17 )             38.0     05-04    145  |  110<H>  |  9   ----------------------------<  153<H>  3.6   |  27  |  0.97    Ca    8.6      04 May 2018 06:17    TPro  6.4  /  Alb  3.1<L>  /  TBili  0.4  /  DBili  x   /  AST  16  /  ALT  24  /  AlkPhos  68  05-04  PT/INR - ( 03 May 2018 12:28 )   PT: 12.2 sec;   INR: 1.12 ratio    PTT - ( 03 May 2018 12:28 )  PTT:25.8 sec      Assessment: 53M admitted with PSBO. +Flatus    Plan:  -clamp trial  -f/u AM AXR  -continue DVT prophylaxis, OOB, Ambulating   -f/u labs  -will discuss with Dr. Hodges

## 2018-05-05 NOTE — PROGRESS NOTE ADULT - PROBLEM SELECTOR PLAN 1
Admit  NPO  NGT  Intravenous fluids  Pain management  Surgical evaluation appreciated.  NGT clamped. Serial xrays.

## 2018-05-06 LAB
ANION GAP SERPL CALC-SCNC: 10 MMOL/L — SIGNIFICANT CHANGE UP (ref 5–17)
BUN SERPL-MCNC: 8 MG/DL — SIGNIFICANT CHANGE UP (ref 7–23)
CALCIUM SERPL-MCNC: 8.8 MG/DL — SIGNIFICANT CHANGE UP (ref 8.5–10.1)
CHLORIDE SERPL-SCNC: 108 MMOL/L — SIGNIFICANT CHANGE UP (ref 96–108)
CO2 SERPL-SCNC: 27 MMOL/L — SIGNIFICANT CHANGE UP (ref 22–31)
CREAT SERPL-MCNC: 1.05 MG/DL — SIGNIFICANT CHANGE UP (ref 0.5–1.3)
GLUCOSE SERPL-MCNC: 131 MG/DL — HIGH (ref 70–99)
HCT VFR BLD CALC: 39.5 % — SIGNIFICANT CHANGE UP (ref 39–50)
HGB BLD-MCNC: 13.3 G/DL — SIGNIFICANT CHANGE UP (ref 13–17)
MAGNESIUM SERPL-MCNC: 2.1 MG/DL — SIGNIFICANT CHANGE UP (ref 1.6–2.6)
MCHC RBC-ENTMCNC: 29.8 PG — SIGNIFICANT CHANGE UP (ref 27–34)
MCHC RBC-ENTMCNC: 33.7 GM/DL — SIGNIFICANT CHANGE UP (ref 32–36)
MCV RBC AUTO: 88.4 FL — SIGNIFICANT CHANGE UP (ref 80–100)
NRBC # BLD: 0 /100 WBCS — SIGNIFICANT CHANGE UP (ref 0–0)
PHOSPHATE SERPL-MCNC: 4.2 MG/DL — SIGNIFICANT CHANGE UP (ref 2.5–4.5)
PLATELET # BLD AUTO: 198 K/UL — SIGNIFICANT CHANGE UP (ref 150–400)
POTASSIUM SERPL-MCNC: 3.2 MMOL/L — LOW (ref 3.5–5.3)
POTASSIUM SERPL-SCNC: 3.2 MMOL/L — LOW (ref 3.5–5.3)
RBC # BLD: 4.47 M/UL — SIGNIFICANT CHANGE UP (ref 4.2–5.8)
RBC # FLD: 12.7 % — SIGNIFICANT CHANGE UP (ref 10.3–14.5)
SODIUM SERPL-SCNC: 145 MMOL/L — SIGNIFICANT CHANGE UP (ref 135–145)
WBC # BLD: 4.61 K/UL — SIGNIFICANT CHANGE UP (ref 3.8–10.5)
WBC # FLD AUTO: 4.61 K/UL — SIGNIFICANT CHANGE UP (ref 3.8–10.5)

## 2018-05-06 PROCEDURE — 74019 RADEX ABDOMEN 2 VIEWS: CPT | Mod: 26

## 2018-05-06 RX ORDER — POTASSIUM CHLORIDE 20 MEQ
40 PACKET (EA) ORAL ONCE
Qty: 0 | Refills: 0 | Status: COMPLETED | OUTPATIENT
Start: 2018-05-06 | End: 2018-05-06

## 2018-05-06 RX ADMIN — Medication 40 MILLIEQUIVALENT(S): at 11:06

## 2018-05-06 NOTE — PROGRESS NOTE ADULT - SUBJECTIVE AND OBJECTIVE BOX
INTERVAL HPI/OVERNIGHT EVENTS:  Pt. seen and examined at bedside resting comfortably. Patient denies abdominal pain, N/V. Feels much better. Tolerated clear liquids for breakfast and full liquids for lunch. Had a BM this morning, continues to pass flatus. Ambulating. Voiding well.   Denies fever/chills, chest pain, dyspnea, cough, dizziness.     Vital Signs Last 24 Hrs  T(C): 36.6 (06 May 2018 11:42), Max: 36.6 (05 May 2018 17:29)  T(F): 97.8 (06 May 2018 11:42), Max: 97.9 (05 May 2018 17:29)  HR: 80 (06 May 2018 11:42) (66 - 80)  BP: 126/78 (06 May 2018 11:42) (119/79 - 136/86)  RR: 17 (06 May 2018 11:42) (16 - 17)  SpO2: 98% (06 May 2018 11:42) (96% - 98%)    PHYSICAL EXAM:    GENERAL: NAD  CHEST/LUNG: Clear to ausculation, bilaterally   HEART: S1S2  ABDOMEN: non distended, +BS, soft, non tender, no guarding  EXTREMITIES:  calf soft, non tender b/l. 2+ distal pulses b/l.     I&O's Detail    05 May 2018 07:01  -  06 May 2018 07:00  --------------------------------------------------------  IN:    dextrose 5% + sodium chloride 0.9%: 2400 mL    Oral Fluid: 240 mL  Total IN: 2640 mL    OUT:  Total OUT: 0 mL    Total NET: 2640 mL    LABS:                        13.3   4.61  )-----------( 198      ( 06 May 2018 06:48 )             39.5     05-06    145  |  108  |  8   ----------------------------<  131<H>  3.2<L>   |  27  |  1.05    Ca    8.8      06 May 2018 06:43  Phos  4.2     05-06  Mg     2.1     05-06        RADIOLOGY & ADDITIONAL STUDIES:  < from: Xray Abdomen 2 Views (05.06.18 @ 09:50) >  IMPRESSION:     Nonspecific intestinal gas pattern. Oral contrast again noted in the   colon. No significant change.    The NG tube has been removed.    < end of copied text >    Assessment: 53M admitted with PSBO,, resolved. NGT out, patient tolerating liquids, with bowel function (+BM).     Plan:  -Tolerating full liquids--> Advance to low fiber diet for dinner and OK to discharge patient if tolerating reg. diet  -Patient should follow up with Dr. King as outpatient   -continue DVT prophylaxis, OOB, Ambulating  -Discussed with Dr. Hodges INTERVAL HPI/OVERNIGHT EVENTS:  Pt. seen and examined at bedside resting comfortably. Patient denies abdominal pain, N/V. Feels much better. Tolerated clear liquids for breakfast and full liquids for lunch. Had a BM this morning, continues to pass flatus. Ambulating. Voiding well.   Denies fever/chills, chest pain, dyspnea, cough, dizziness.     Vital Signs Last 24 Hrs  T(C): 36.6 (06 May 2018 11:42), Max: 36.6 (05 May 2018 17:29)  T(F): 97.8 (06 May 2018 11:42), Max: 97.9 (05 May 2018 17:29)  HR: 80 (06 May 2018 11:42) (66 - 80)  BP: 126/78 (06 May 2018 11:42) (119/79 - 136/86)  RR: 17 (06 May 2018 11:42) (16 - 17)  SpO2: 98% (06 May 2018 11:42) (96% - 98%)    PHYSICAL EXAM:    GENERAL: NAD  CHEST/LUNG: Clear to ausculation, bilaterally   HEART: S1S2  ABDOMEN: non distended, +BS, soft, non tender, no guarding  EXTREMITIES:  calf soft, non tender b/l. 2+ distal pulses b/l.     I&O's Detail    05 May 2018 07:01  -  06 May 2018 07:00  --------------------------------------------------------  IN:    dextrose 5% + sodium chloride 0.9%: 2400 mL    Oral Fluid: 240 mL  Total IN: 2640 mL    OUT:  Total OUT: 0 mL    Total NET: 2640 mL    LABS:                        13.3   4.61  )-----------( 198      ( 06 May 2018 06:48 )             39.5     05-06    145  |  108  |  8   ----------------------------<  131<H>  3.2<L>   |  27  |  1.05    Ca    8.8      06 May 2018 06:43  Phos  4.2     05-06  Mg     2.1     05-06        RADIOLOGY & ADDITIONAL STUDIES:  < from: Xray Abdomen 2 Views (05.06.18 @ 09:50) >  IMPRESSION:     Nonspecific intestinal gas pattern. Oral contrast again noted in the   colon. No significant change.    The NG tube has been removed.    < end of copied text >    Assessment: 53M admitted with PSBO,, resolved. NGT out, patient tolerating liquids, with bowel function (+BM).     Plan:  -Tolerating full liquids--> Advance to low fiber diet for dinner and OK to discharge patient if tolerating reg. diet  -Patient should follow up with Dr. King as outpatient   -continue DVT prophylaxis, OOB, Ambulating  -Discussed with Dr. Hodges   -Follow up with Dr King.

## 2018-05-06 NOTE — PROGRESS NOTE ADULT - SUBJECTIVE AND OBJECTIVE BOX
SURGERY PROGRESS HPI:  Pt seen and examined at bedside. Denies pain and complaints. Pt tolerating ice chips. Pt denies nausea and vomiting. +flatus. No BM. Voiding well. Pt denies chest pain, SOB, dizziness, fever, chills. Ambulating.      Vital Signs Last 24 Hrs  T(C): 36.6 (05 May 2018 17:29), Max: 36.9 (05 May 2018 11:41)  T(F): 97.9 (05 May 2018 17:29), Max: 98.5 (05 May 2018 11:41)  HR: 68 (05 May 2018 23:37) (56 - 68)  BP: 132/84 (05 May 2018 23:37) (132/84 - 150/92)  BP(mean): --  RR: 16 (05 May 2018 23:37) (16 - 17)  SpO2: 96% (05 May 2018 23:37) (96% - 98%)      PHYSICAL EXAM:    GENERAL: NAD  CHEST/LUNG: Clear to ausculation, bilaterally   HEART: RRR S1S2  ABDOMEN: non distended, +BS, soft, non tender, no guarding  EXTREMITIES:  calf soft, non tender b/l    I&O's Detail    04 May 2018 07:01  -  05 May 2018 07:00  --------------------------------------------------------  IN:    dextrose 5% + sodium chloride 0.9%.: 1200 mL    Oral Fluid: 240 mL  Total IN: 1440 mL    OUT:    Nasoenteral Tube: 400 mL    Voided: 300 mL  Total OUT: 700 mL    Total NET: 740 mL      05 May 2018 07:01  -  06 May 2018 05:26  --------------------------------------------------------  IN:    dextrose 5% + sodium chloride 0.9%.: 1200 mL    Oral Fluid: 240 mL  Total IN: 1440 mL    OUT:  Total OUT: 0 mL    Total NET: 1440 mL    LABS:                        14.2   5.41  )-----------( 196      ( 05 May 2018 07:59 )             42.3     05-05    144  |  105  |  6<L>  ----------------------------<  134<H>  3.6   |  29  |  0.97    Ca    9.2      05 May 2018 07:59  Phos  3.2     05-05  Mg     2.0     05-05    TPro  6.4  /  Alb  3.1<L>  /  TBili  0.4  /  DBili  x   /  AST  16  /  ALT  24  /  AlkPhos  68  05-04    Xray Abdomen Minimum 3 Views (05.05.18 @ 09:56)   IMPRESSION  Oral contrast is noted in the colon. Bowel pattern is nonspecific. NG   tube in situ.    Assessment: 53M admitted with PSBO. +Flatus. NGT out.    Plan:  -advance diet to clear liquids  -continue DVT prophylaxis, OOB, Ambulating  -f/u labs  -will discuss pt with surgical attending SURGERY PROGRESS HPI:  Pt seen and examined at bedside. Denies pain and complaints. Pt tolerating ice chips.   Pt denies nausea and vomiting. +flatus. No BM. Voiding well. Pt denies chest pain, SOB, dizziness, fever, chills. Ambulating.      Vital Signs Last 24 Hrs  T(C): 36.6 (05 May 2018 17:29), Max: 36.9 (05 May 2018 11:41)  T(F): 97.9 (05 May 2018 17:29), Max: 98.5 (05 May 2018 11:41)  HR: 68 (05 May 2018 23:37) (56 - 68)  BP: 132/84 (05 May 2018 23:37) (132/84 - 150/92)  BP(mean): --  RR: 16 (05 May 2018 23:37) (16 - 17)  SpO2: 96% (05 May 2018 23:37) (96% - 98%)      PHYSICAL EXAM:    GENERAL: NAD  CHEST/LUNG: Clear to ausculation, bilaterally   HEART: RRR S1S2  ABDOMEN: non distended, +BS, soft, non tender, no guarding  EXTREMITIES:  calf soft, non tender b/l    I&O's Detail    04 May 2018 07:01  -  05 May 2018 07:00  --------------------------------------------------------  IN:    dextrose 5% + sodium chloride 0.9%.: 1200 mL    Oral Fluid: 240 mL  Total IN: 1440 mL    OUT:    Nasoenteral Tube: 400 mL    Voided: 300 mL  Total OUT: 700 mL    Total NET: 740 mL      05 May 2018 07:01  -  06 May 2018 05:26  --------------------------------------------------------  IN:    dextrose 5% + sodium chloride 0.9%.: 1200 mL    Oral Fluid: 240 mL  Total IN: 1440 mL    OUT:  Total OUT: 0 mL    Total NET: 1440 mL    LABS:                        14.2   5.41  )-----------( 196      ( 05 May 2018 07:59 )             42.3     05-05    144  |  105  |  6<L>  ----------------------------<  134<H>  3.6   |  29  |  0.97    Ca    9.2      05 May 2018 07:59  Phos  3.2     05-05  Mg     2.0     05-05    TPro  6.4  /  Alb  3.1<L>  /  TBili  0.4  /  DBili  x   /  AST  16  /  ALT  24  /  AlkPhos  68  05-04    Xray Abdomen Minimum 3 Views (05.05.18 @ 09:56)   IMPRESSION  Oral contrast is noted in the colon. Bowel pattern is nonspecific. NG   tube in situ.    Assessment: 53M admitted with PSBO. +Flatus. NGT out.    Plan:  -advance diet to clear liquids  -continue DVT prophylaxis, OOB, Ambulating  -f/u labs  -will discuss pt with surgical attending

## 2018-05-06 NOTE — PROGRESS NOTE ADULT - SUBJECTIVE AND OBJECTIVE BOX
CC:  Patient seen and examined bedside resting comfortably.  No new complaints offered. Tolerating solids.  Moved bowels today.  Denies chest pain, dyspnea, cough.      HPI:  52 yo m with pmh of umbilical hernia presents in ED c/o abdominal pain and nbnb vomitus today s/p eating pasta and meat. No aggravating or relieving factors, No fever/chills, No photophobia/eye pain/changes in vision, No ear pain/sore throat/dysphagia, No chest pain/palpitations, no SOB/cough/wheeze/stridor, No D, no dysuria/frequency/discharge, No neck/back pain, no rash, no changes in neurological status/function. (03 May 2018 09:40)      T(F): 97.9 (05-06-18 @ 17:11), Max: 97.9 (05-06-18 @ 17:11)  HR: 61 (05-06-18 @ 17:11) (61 - 80)  BP: 147/81 (05-06-18 @ 17:11) (119/79 - 147/81)  RR: 16 (05-06-18 @ 17:11) (16 - 17)  SpO2: 96% (05-06-18 @ 17:11) (96% - 98%)  Wt(kg): --  CAPILLARY BLOOD GLUCOSE          PHYSICAL EXAM:  General: NAD, WDWN.   Neuro:  Alert & oriented x 3  HEENT: NCAT, EOMI, conjunctiva clear  CV: +S1+S2 regular rate and rhythm  Lung: clear to ausculation bilaterally, respirations nonlabored, good inspiratory effort  Abdomen: soft, NTND. Normactive BS  Extremities: no pedal edema or calf tenderness noted     LABS:                        13.3   4.61  )-----------( 198      ( 06 May 2018 06:48 )             39.5     05-06    145  |  108  |  8   ----------------------------<  131<H>  3.2<L>   |  27  |  1.05    Ca    8.8      06 May 2018 06:43  Phos  4.2     05-06  Mg     2.1     05-06            I&O's Detail    05 May 2018 07:01  -  06 May 2018 07:00  --------------------------------------------------------  IN:    dextrose 5% + sodium chloride 0.9%: 2400 mL    Oral Fluid: 240 mL  Total IN: 2640 mL    OUT:  Total OUT: 0 mL    Total NET: 2640 mL      06 May 2018 07:01  -  06 May 2018 21:13  --------------------------------------------------------  IN:    Oral Fluid: 240 mL  Total IN: 240 mL    OUT:  Total OUT: 0 mL    Total NET: 240 mL            Assessment: 53y Male admitted with SMALL BOWEL OBSTRUCTION  ABDOMINAL PAIN    PMH Umbilical hernia      Plan:  -  -continue VTE and GI prophylaxis: heparin and protonix IV  -f/u labs  -will discuss with

## 2018-05-07 ENCOUNTER — TRANSCRIPTION ENCOUNTER (OUTPATIENT)
Age: 53
End: 2018-05-07

## 2018-05-07 VITALS
TEMPERATURE: 97 F | WEIGHT: 143.74 LBS | DIASTOLIC BLOOD PRESSURE: 71 MMHG | RESPIRATION RATE: 16 BRPM | HEART RATE: 63 BPM | OXYGEN SATURATION: 99 % | SYSTOLIC BLOOD PRESSURE: 114 MMHG

## 2018-05-07 NOTE — DISCHARGE NOTE ADULT - PLAN OF CARE
resolved f/u with Dr. Morrissey and Dr. Hodges within 1 week stable f/u with Dr. Hodges within 1 week

## 2018-05-07 NOTE — DISCHARGE NOTE ADULT - CARE PROVIDER_API CALL
Huy Morrissey), Internal Medicine; Nephrology  [address] [facility]  [address]  [place] [address]  Phone: [phone]  Fax: [phone]    Pranay Hodges), Surgery  [id number] [place] [address]  Phone: [phone]  Fax: [phone]

## 2018-05-07 NOTE — DISCHARGE NOTE ADULT - CARE PLAN
Principal Discharge DX:	SBO (small bowel obstruction)  Goal:	resolved  Assessment and plan of treatment:	f/u with Dr. Morrissey and Dr. Hodges within 1 week  Secondary Diagnosis:	Ventral hernia without obstruction or gangrene  Goal:	stable  Assessment and plan of treatment:	f/u with Dr. Hodges within 1 week

## 2018-05-07 NOTE — DISCHARGE NOTE ADULT - HOSPITAL COURSE
54 yo m with pmh of umbilical hernia presents in ED c/o abdominal pain and nbnb vomitus today s/p eating pasta and meat.  admitted with SBO (small bowel obstruction).  Plan: Surgical evaluation appreciated.  Small bowel obstruction resolved.  Discharge home.     Problem/Plan - 2:  ·  Problem: Ventral hernia without obstruction or gangrene.  Plan: as above.   d/c paper done as directed by attending

## 2018-05-07 NOTE — DISCHARGE NOTE ADULT - PATIENT PORTAL LINK FT
You can access the [facility] Patient Portal, offered by [facility], by registering with the following website: [email or web address]

## 2018-05-17 DIAGNOSIS — K43.9 VENTRAL HERNIA WITHOUT OBSTRUCTION OR GANGRENE: ICD-10-CM

## 2018-05-17 DIAGNOSIS — R10.9 UNSPECIFIED ABDOMINAL PAIN: ICD-10-CM

## 2018-05-17 DIAGNOSIS — K56.609 UNSPECIFIED INTESTINAL OBSTRUCTION, UNSPECIFIED AS TO PARTIAL VERSUS COMPLETE OBSTRUCTION: ICD-10-CM

## 2018-05-17 DIAGNOSIS — Z28.21 IMMUNIZATION NOT CARRIED OUT BECAUSE OF PATIENT REFUSAL: ICD-10-CM

## 2022-06-19 ENCOUNTER — EMERGENCY (EMERGENCY)
Facility: HOSPITAL | Age: 57
LOS: 0 days | Discharge: HOME | End: 2022-06-20
Attending: STUDENT IN AN ORGANIZED HEALTH CARE EDUCATION/TRAINING PROGRAM
Payer: COMMERCIAL

## 2022-06-19 VITALS
SYSTOLIC BLOOD PRESSURE: 159 MMHG | HEART RATE: 74 BPM | HEIGHT: 65 IN | RESPIRATION RATE: 20 BRPM | DIASTOLIC BLOOD PRESSURE: 97 MMHG | TEMPERATURE: 99 F | OXYGEN SATURATION: 99 % | WEIGHT: 145.95 LBS

## 2022-06-19 DIAGNOSIS — M25.571 PAIN IN RIGHT ANKLE AND JOINTS OF RIGHT FOOT: ICD-10-CM

## 2022-06-19 PROCEDURE — 99283 EMERGENCY DEPT VISIT LOW MDM: CPT

## 2022-06-20 VITALS
HEART RATE: 63 BPM | DIASTOLIC BLOOD PRESSURE: 89 MMHG | TEMPERATURE: 98 F | OXYGEN SATURATION: 99 % | SYSTOLIC BLOOD PRESSURE: 138 MMHG | RESPIRATION RATE: 18 BRPM

## 2022-06-20 PROCEDURE — 73610 X-RAY EXAM OF ANKLE: CPT | Mod: 26,RT

## 2022-06-20 RX ORDER — ACETAMINOPHEN 500 MG
1 TABLET ORAL
Qty: 28 | Refills: 0
Start: 2022-06-20 | End: 2022-06-26

## 2022-06-20 RX ORDER — ACETAMINOPHEN 500 MG
650 TABLET ORAL ONCE
Refills: 0 | Status: COMPLETED | OUTPATIENT
Start: 2022-06-20 | End: 2022-06-20

## 2022-06-20 RX ADMIN — Medication 650 MILLIGRAM(S): at 02:21

## 2022-06-20 NOTE — ED ADULT NURSE REASSESSMENT NOTE - NS ED NURSE REASSESS COMMENT FT1
Patient refuses discharge vital signs r/t wife wants to go home. Cane and discharge given to patient. No acute distress noted.

## 2022-06-20 NOTE — ED ADULT NURSE NOTE - OBJECTIVE STATEMENT
Pt alert and oriented present to ed with PW nontraumatic R foot pain " pt states pain in nerve near ankle". denies any traum to side, slight redness noted.  hx of DM

## 2022-06-20 NOTE — ED PROVIDER NOTE - PATIENT PORTAL LINK FT
You can access the FollowMyHealth Patient Portal offered by [facility] by registering at the following website: [email or web address]. By joining [facility]’s FollowMyHealth portal, you will also be able to view your health information using other applications (apps) compatible with our system.

## 2022-06-20 NOTE — ED PROVIDER NOTE - CARE PROVIDER_API CALL
Maty Jimenez (DPM)  Podiatric Medicine and Surgery  [address] [place] [address]  Phone: [phone]  Fax: [phone]  Follow Up Time:

## 2022-06-20 NOTE — ED PROVIDER NOTE - PHYSICAL EXAMINATION
General: Awake, alert and oriented. No acute distress. Well developed, hydrated and nourished. Appears stated age.   Skin: Skin in warm, dry and intact without rashes or lesions. Appropriate color for ethnicity  HENMT: head normocephalic and atraumatic; bilateral external ears without swelling. no nasal discharge. moist oral mucosa. supple neck, trachea midline  EYES: Conjunctiva clear. nonicteric sclera. EOM intact, Eyelids are normal in appearance without swelling or lesions.  Cardiac: well perfused  Respiratory: breathing comfortably on room air. no audible wheezing or stridor  Abdominal: nondistended  MSK: Neck and back are without deformity, visible external skin changes, or signs of trauma. Curvature of the cervical, thoracic, and lumbar spine are within normal limits. no external signs of trauma. no apparent deficits in ROM of any extremity. biulateral ankles with no swelling, ttp, deformity. rom itnact. nontender  Neurological: The patient is awake, alert and oriented to person, place, and time with normal speech. CN 2-12 grossly intact. no apparent deficits. Memory is normal and thought process is intact. No gait abnormalities are appreciated.   Psychiatric: Appropriate mood and affect. Good judgement and insight. No visual or auditory hallucinations.

## 2022-06-20 NOTE — ED PROVIDER NOTE - OBJECTIVE STATEMENT
57m. developed pain on outside of right ankle today. no trauma. able to ambulate. no radiation of pain.

## 2023-03-24 NOTE — PATIENT PROFILE ADULT. - ANESTHESIA, PREVIOUS REACTION, PROFILE
Central Prior Authorization Team   Phone: [phone]    [place] Initiation    Medication: semaglutide (OZEMPIC) 2 MG/1.5ML SOPN pen  Insurance Company: [employer] - Phone [phone] Fax [phone]  Pharmacy Filling the Rx: [id number] [place] [id number] [facility] [address]  Filling Pharmacy Phone: [phone]  Filling Pharmacy Fax:    Start Date: 3/24/2023     none
